# Patient Record
Sex: MALE | Race: BLACK OR AFRICAN AMERICAN | NOT HISPANIC OR LATINO | Employment: UNEMPLOYED | URBAN - METROPOLITAN AREA
[De-identification: names, ages, dates, MRNs, and addresses within clinical notes are randomized per-mention and may not be internally consistent; named-entity substitution may affect disease eponyms.]

---

## 2017-05-21 ENCOUNTER — TELEPHONE (OUTPATIENT)
Dept: NURSING | Facility: CLINIC | Age: 22
End: 2017-05-21

## 2017-05-21 NOTE — TELEPHONE ENCOUNTER
"Call Type: Triage Call    Presenting Problem: Patient states he has been treated for a \"Sinus\"  infectoin and allergies and continues to experience \"Spitting a lot,  my voice sounds deeper.\" \"It's hard to  open my eyes.\"  Occasionally  coughs sputum with color. States he was an antibiotic for a Sinus  Infection approximately \"Three weeks ago (04/30/17).\"  Triage Note:  Guideline Title: Upper Respiratory Infection (URI)  Recommended Disposition: See Provider within 24 hours  Original Inclination: Would have called ED  Override Disposition:  Intended Action: See /Paramjit Appt  Physician Contacted: No  New productive cough with thick colored sputum (other than clear or white sputum;  not postnasal drainage) ?  YES  Severe breathing problems ? NO  Breathing symptoms (wheezing, shortness of breath, changes in rates of breathing,  skin color changes) main problem ? NO  Sore throat is the symptom causing most concern ? NO  Cough is the symptom causing most concern ? NO  Temperature of 101.5 F (38.6 C) or greater that has not responded to 24 hours of  home care measures ? NO  Sudden onset of flu-like symptoms ? NO  Pregnant and has a temperature up to 100 F (37.7 C) that has not responded to 3  days of home care ? NO  Being treated by a provider for a secondary infection AND no improvement in  symptoms, symptoms have worsened OR has new symptoms after following treatment  plan for the time specified by provider. ? NO  Mild to moderate headache for more than 24 hours unrelieved with 8 hours of  nonprescription medications ? NO  Severe headache unrelieved by 8 hours of nonprescription medication ? NO  Pregnant and has temperature 100F (37.7 C) or greater ? NO  Any temperature elevation in an immunocompromised individual OR frail elderly with  signs of dehydration ? NO  Physician Instructions:  Care Advice: Use a cool mist humidifier to moisten air.  Be sure to clean  according to 's instructions.  May inhale steam " from hot shower or heated water. Be careful to avoid  burns.  Limit or avoid exposure to irritants and allergens (e.g. air pollution,  smoke/smoking, chemicals, dust, pollen, pet dander, etc.)  Drink more fluids -- water, low-sugar juices, tea and warm soup, especially  chicken broth, are options.  Avoid caffeinated or alcoholic beverages  because they can increase the chance of dehydration.  If you can, stop smoking now and avoid all secondhand smoke.  SYMPTOM / CONDITION MANAGEMENT  Coughing up mucus or phlegm helps to get rid of an infection.  A productive  cough should not be stopped.  A cough medicine with guaifenesin  (Robitussin, Mucinex) can help loosen the mucus.  Cough medicine with  dextromethorphan (DM) should be avoided.  Drinking lots of fluids can help  loosen the mucus too, especially warm fluids.  Go to the ED if having chest pain with breathing or breathing is becoming  more difficult.  Call provider if has a fever over 101.5 F (38.6 C) that has not responded  to home care measures, having shaking chills or any fever in someone  immunocompromised/frail elderly.  Respiratory Hygiene:   - Cover the nose/mouth tightly with a tissue when  coughing or sneezing.    - Use tissue 1 time and discard in the nearest  waste receptacle.    - Wash hands with soap and water or alcohol-based hand  rub after coming into contact with respiratory secretions and contaminated  objects/materials.    - Alternatively when no tissue is available, cough  into the bend of the elbow.  - Avoid touching your eyes, nose or mouth.  Total water intake includes drinking water, water in beverages, and water  contained in food. Fluids make up about 80% of the body's total hydration  need. Individual fluid requirement to maintain hydration vary based on  physical activity, environmental factors and illness. Limit fluids that  contain sugar, caffeine, or alcohol. Urine will be very light yellow color  when you drink enough  fluids.  Analgesic/Antipyretic Advice - NSAIDs: Consider aspirin, ibuprofen,  naproxen or ketoprofen for pain or fever as directed on label or by  pharmacist/provider. PRECAUTIONS: - You should not take this medicine for  more than 10 days unless recommended by your provider. EXCEPTIONS: - Should  not be used if taking blood thinners or have bleeding problems. - Do not  use if have history of sensitivity/allergy to any of these medications  or history of cardiovascular, ulcer, kidney, liver disease or diabetes  unless approved by provider. - Do not exceed recommended dose or frequency.

## 2017-05-25 ENCOUNTER — TELEPHONE (OUTPATIENT)
Dept: NURSING | Facility: CLINIC | Age: 22
End: 2017-05-25

## 2017-05-25 NOTE — TELEPHONE ENCOUNTER
Call Type: Triage Call    Presenting Problem: Patient called in and stated that he has a sinus  infection and wanted some home care options to get him through until  his ENT appointment next Tuesday. He is currently in treatment  several miles from the [facility] so not able to go in sooner or be  evaluated currently. He also mentioned that he has changed to [place] via  [employer] and needs assistance finding network psych and  primary care. He has history of schizophrenia and was dropped by his  prior psych provider due to missing appointments. Provided him the  [employer] [place] member services number and offered home care.  Triage Note:  Guideline Title: Upper Respiratory Infection (URI)  Recommended Disposition: See Provider within 72 Hours  Original Inclination: Wanted to speak with a nurse  Override Disposition:  Intended Action: Follow Selfcare / Homecare  Physician Contacted: No  Generalized mild frontal headache unresponsive to 24 hours of home care measures ?  YES  Severe breathing problems ? NO  Breathing symptoms (wheezing, shortness of breath, changes in rates of breathing,  skin color changes) main problem ? NO  Sore throat is the symptom causing most concern ? NO  Cough is the symptom causing most concern ? NO  New onset of eye redness, irritation/foreign body sensation or gritty feeling with  yellow/green drainage ? NO  Temperature of 101.5 F (38.6 C) or greater that has not responded to 24 hours of  home care measures ? NO  Symptoms worsen after 7 days or symptoms do not improve after 14 days of home care  ? NO  Sudden onset of flu-like symptoms ? NO  Pregnant and has a temperature up to 100 F (37.7 C) that has not responded to 3  days of home care ? NO  Being treated by a provider for a secondary infection AND no improvement in  symptoms, symptoms have worsened OR has new symptoms after following treatment  plan for the time specified by provider. ? NO  Previous call within last week for similar  symptoms AND has followed recommended  self care measures for a week but symptoms have not improved or symptoms have  worsened. ? NO  Mild to moderate headache for more than 24 hours unrelieved with 8 hours of  nonprescription medications ? NO  Severe headache unrelieved by 8 hours of nonprescription medication ? NO  New productive cough with thick colored sputum (other than clear or white sputum;  not postnasal drainage) ? NO  Pregnant and has temperature 100F (37.7 C) or greater ? NO  Any temperature elevation in an immunocompromised individual OR frail elderly with  signs of dehydration ? NO  Physician Instructions:  Care Advice: Call provider if headache worsens, develops temperature greater  than 101.5F (38.1C) or any temperature elevation in a geriatric or  immunocompromised patient (such as diabetes, HIV/AIDS, chemotherapy, organ  transplant, or chronic steroid use).  Drink more fluids -- water, low-sugar juices, tea and warm soup, especially  chicken broth, are options.  Avoid caffeinated or alcoholic beverages  because they can increase the chance of dehydration.  CAUTIONS  A warm, moist compress placed on face, over eyes for 15 to 20 minutes, 5 to  6 times a day, may help relieve the congestion.  Consider nonprescription decongestant (Sudafed, Drixoral) for relief of  symptoms after checking with a provider, especially if there is a history  of hypertension, hyperthyroidism, heart disease, diabetes, glaucoma,  urinary retention caused by prostatic hypertrophy.  Analgesic/Antipyretic Advice - Acetaminophen: Consider acetaminophen as  directed on label or by pharmacist/provider for pain or fever. PRECAUTIONS:  - Use if there is no history of liver disease, alcoholism, or intake of  three or more alcohol drinks per day - Only if approved by provider during  pregnancy or when breastfeeding - Do not exceed recommended dose or  frequency. Do not take more than 3000 milligrams (mg) in 24 hours. Do not  take this  medicine for more than 10 days unless recommended by your  provider. - During pregnancy, acetaminophen should not be taken more than 3  consecutive days without telling provider - To make sure you don't take too  much, check other medicines you take to see if they also contain  acetaminophen.

## 2017-07-26 PROBLEM — R25.9 ABNORMAL INVOLUNTARY MOVEMENT: Status: ACTIVE | Noted: 2017-07-26

## 2017-07-28 ENCOUNTER — PRE VISIT (OUTPATIENT)
Dept: NEUROLOGY | Facility: CLINIC | Age: 22
End: 2017-07-28

## 2017-07-28 NOTE — TELEPHONE ENCOUNTER
Attempt to reach pt by phone for Previsit intake. Unable to reach pt, phone message stated phone number no longer valid or incorrect. No other phone number available to reach patient.

## 2017-08-03 RX ORDER — HYDROXYZINE HYDROCHLORIDE 50 MG/1
TABLET, FILM COATED ORAL DAILY PRN
COMMUNITY
Start: 2014-01-19

## 2017-08-03 RX ORDER — DIVALPROEX SODIUM 500 MG/1
1500 TABLET, EXTENDED RELEASE ORAL AT BEDTIME
COMMUNITY
Start: 2015-02-19

## 2017-08-03 RX ORDER — BENZTROPINE MESYLATE 1 MG/1
1 TABLET ORAL 2 TIMES DAILY
COMMUNITY
Start: 2015-02-19

## 2017-08-03 RX ORDER — OLANZAPINE 20 MG/1
20 TABLET ORAL 2 TIMES DAILY
COMMUNITY
Start: 2015-02-20

## 2017-08-03 RX ORDER — HYDROCODONE BITARTRATE AND ACETAMINOPHEN 5; 325 MG/1; MG/1
TABLET ORAL
COMMUNITY
Start: 2016-05-30

## 2017-08-03 RX ORDER — OLANZAPINE 10 MG/1
20 TABLET ORAL 2 TIMES DAILY
COMMUNITY
Start: 2014-01-19

## 2017-08-03 RX ORDER — HALOPERIDOL 5 MG/1
5 TABLET ORAL DAILY
COMMUNITY
Start: 2015-02-19

## 2017-08-03 RX ORDER — OLANZAPINE 10 MG/1
15 TABLET ORAL 2 TIMES DAILY
COMMUNITY
Start: 2016-03-18

## 2017-08-03 RX ORDER — TRAZODONE HYDROCHLORIDE 50 MG/1
50 TABLET, FILM COATED ORAL AT BEDTIME
COMMUNITY
Start: 2015-02-19

## 2017-08-03 RX ORDER — IBUPROFEN 600 MG/1
600 TABLET, FILM COATED ORAL
COMMUNITY
Start: 2016-03-18

## 2017-08-03 RX ORDER — BENZTROPINE MESYLATE 2 MG/1
2 TABLET ORAL 2 TIMES DAILY
COMMUNITY
Start: 2016-03-18

## 2017-08-03 RX ORDER — IBUPROFEN 600 MG/1
600 TABLET, FILM COATED ORAL
COMMUNITY
Start: 2016-05-30

## 2017-08-03 RX ORDER — DIPHENHYDRAMINE HCL 25 MG
50 TABLET ORAL
COMMUNITY
Start: 2014-01-19

## 2017-08-03 RX ORDER — BENZONATATE 100 MG/1
100-200 CAPSULE ORAL
COMMUNITY
Start: 2016-03-18

## 2017-08-03 RX ORDER — FLUPHENAZINE HYDROCHLORIDE 10 MG/1
10 TABLET ORAL 3 TIMES DAILY
COMMUNITY
Start: 2014-01-19

## 2017-08-03 RX ORDER — HALOPERIDOL 10 MG/1
10 TABLET ORAL AT BEDTIME
COMMUNITY
Start: 2015-02-19

## 2017-08-03 RX ORDER — BENZTROPINE MESYLATE 1 MG/1
1 TABLET ORAL 3 TIMES DAILY
COMMUNITY
Start: 2014-01-19

## 2017-08-03 RX ORDER — ACETAMINOPHEN 325 MG/1
650 TABLET ORAL
COMMUNITY
Start: 2016-03-18

## 2017-08-03 RX ORDER — OLANZAPINE 10 MG/1
10 TABLET ORAL DAILY PRN
COMMUNITY
Start: 2014-01-19

## 2017-08-07 ENCOUNTER — OFFICE VISIT (OUTPATIENT)
Dept: NEUROLOGY | Facility: CLINIC | Age: 22
End: 2017-08-07

## 2017-08-07 DIAGNOSIS — R25.9 ABNORMAL INVOLUNTARY MOVEMENT: Primary | ICD-10-CM

## 2017-08-07 PROCEDURE — 99207 ZZC NO CHARGE LOS: CPT | Performed by: PSYCHIATRY & NEUROLOGY

## 2017-08-07 NOTE — MR AVS SNAPSHOT
After Visit Summary   2017    Jovanna Darnell    MRN: [id number]           Patient Information     Date Of Birth          1995        Visit Information        Provider Department      2017 12:30 PM Codey Bains MD [facility]        Today's Diagnoses     Abnormal involuntary movement    -  1       Follow-ups after your visit        Who to contact     If you have questions or need follow up information about today's clinic visit or your schedule please contact [facility] directly at [phone].  Normal or non-critical lab and imaging results will be communicated to you by MyChart, letter or phone within 4 business days after the clinic has received the results. If you do not hear from us within 7 days, please contact the clinic through [employer] or phone. If you have a critical or abnormal lab result, we will notify you by phone as soon as possible.  Submit refill requests through [employer] or call your pharmacy and they will forward the refill request to us. Please allow 3 business days for your refill to be completed.          Additional Information About Your Visit        MyChart Information     [employer] is an electronic gateway that provides easy, online access to your medical records. With [employer], you can request a clinic appointment, read your test results, renew a prescription or communicate with your care team.     To sign up for [employer] visit the website at [email or web address]   You will be asked to enter the access code listed below, as well as some personal information. Please follow the directions to create your username and password.     Your access code is: [email or web address]  Expires: 2017 12:51 PM     Your access code will  in 90 days. If you need help or a new code, please contact your [facility] or call [phone] for assistance.        Care EveryWhere ID     This is your Care EveryWhere  ID. This could be used by other organizations to access your [place] medical records  [id number]         Blood Pressure from Last 3 Encounters:   06/01/16 118/76   11/29/14 130/66   02/15/14 (!) 143/97    Weight from Last 3 Encounters:   02/13/14 102.1 kg (225 lb)   01/02/14 93.4 kg (206 lb)   12/17/13 95.3 kg (210 lb)              Today, you had the following     No orders found for display       Primary Care Provider Office Phone # Fax #    [facility] [phone]       [address] [place] [address]        Equal Access to Services     MYA SHAFFER : Oxana Sanchez, walui saldaña, rhoda kaalmada raj, ally everett. So [facility] [phone].    ATENCIÓN: Si habla español, tiene a medina disposición servicios gratuitos de asistencia lingüística. Llame al [phone].    We comply with applicable federal civil rights laws and [place] laws. We do not discriminate on the basis of race, color, national origin, age, disability sex, sexual orientation or gender identity.            Thank you!     Thank you for choosing [facility]  for your care. Our goal is always to provide you with excellent care. Hearing back from our patients is one way we can continue to improve our services. Please take a few minutes to complete the written survey that you may receive in the mail after your visit with us. Thank you!             Your Updated Medication List - Protect others around you: Learn how to safely use, store and throw away your medicines at www.disposemymeds.org.          This list is accurate as of: 8/7/17 12:51 PM.  Always use your most recent med list.                   Brand Name Dispense Instructions for use Diagnosis    acetaminophen 325 MG tablet    TYLENOL     Take 650 mg by mouth        benzonatate 100 MG capsule    TESSALON     Take 100-200 mg by mouth        * benztropine 1 MG tablet    COGENTIN     Take 1  mg by mouth 3 times daily        * benztropine 1 MG tablet    COGENTIN     Take 1 mg by mouth 2 times daily        * benztropine 2 MG tablet    COGENTIN     Take 2 mg by mouth 2 times daily        diphenhydrAMINE 25 MG tablet    BENADRYL     Take 50 mg by mouth nightly as needed        divalproex 500 MG 24 hr tablet    DEPAKOTE ER     Take 1,500 mg by mouth At Bedtime        fluPHENAZine 10 MG tablet    PROLIXIN     Take 10 mg by mouth 3 times daily        * haloperidol 10 MG tablet    HALDOL     Take 10 mg by mouth At Bedtime        * haloperidol 5 MG tablet    HALDOL     Take 5 mg by mouth daily        HYDROcodone-acetaminophen 5-325 MG per tablet    NORCO          hydrOXYzine 50 MG tablet    ATARAX     daily as needed        * ibuprofen 600 MG tablet    ADVIL/MOTRIN     Take 600 mg by mouth        * ibuprofen 600 MG tablet    ADVIL/MOTRIN     Take 600 mg by mouth        * OLANZapine 10 MG tablet    zyPREXA     Take 20 mg by mouth 2 times daily        * OLANZapine 10 MG tablet    zyPREXA     Take 10 mg by mouth daily as needed        * OLANZapine 20 MG tablet    zyPREXA     Take 20 mg by mouth 2 times daily        * OLANZapine 10 MG tablet    zyPREXA     Take 15 mg by mouth 2 times daily        traZODone 50 MG tablet    DESYREL     Take 50 mg by mouth At Bedtime        * Notice:  This list has 11 medication(s) that are the same as other medications prescribed for you. Read the directions carefully, and ask your doctor or other care provider to review them with you.

## 2017-08-07 NOTE — LETTER
8/7/2017       RE: Jovanna Darnell  [address]  [place] [address]     Dear Colleague,    Thank you for referring your patient, Jovanna Darnell, to the [facility]. Please see a copy of my visit note below.    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 Sasha Bains MD

## 2017-08-07 NOTE — PROGRESS NOTES
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 Sasha berry md

## 2017-08-21 ENCOUNTER — COMMUNICATION - HEALTHEAST (OUTPATIENT)
Dept: SCHEDULING | Facility: CLINIC | Age: 22
End: 2017-08-21

## 2017-08-21 ENCOUNTER — NURSE TRIAGE (OUTPATIENT)
Dept: NURSING | Facility: CLINIC | Age: 22
End: 2017-08-21

## 2017-08-22 DIAGNOSIS — H57.9 EYE DISORDER: Primary | ICD-10-CM

## 2017-08-22 NOTE — TELEPHONE ENCOUNTER
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  Reason for Disposition    Caller has NON-URGENT medication question about med that PCP prescribed and triager unable to answer question    Protocols used: MEDICATION QUESTION CALL-ADULT-

## 2017-09-13 DIAGNOSIS — H53.10 SUBJECTIVE VISUAL DISTURBANCE: Primary | ICD-10-CM
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
## 2017-11-02 PROBLEM — M54.50 BILATERAL LOW BACK PAIN WITHOUT SCIATICA: Status: ACTIVE | Noted: 2017-07-17

## 2017-11-07 ENCOUNTER — PRE VISIT (OUTPATIENT)
Dept: NEUROLOGY | Facility: CLINIC | Age: 22
End: 2017-11-07

## 2017-11-07 NOTE — TELEPHONE ENCOUNTER
APPT INFO    Date /Time: 11/7/17, 2:10pm   Reason for Appt: Movement Disorder   Ref Provider/Clinic: JONY BONILLA   Are there internal records? If yes, list:    Patient Contact (Y/N) & Call Details: No, referred    Action: Requested records     OUTSIDE RECORDS CHECKLIST     CLINIC NAME COMMENTS REC (x) IMG (x)   [facility]  x n/a